# Patient Record
Sex: MALE | Race: WHITE
[De-identification: names, ages, dates, MRNs, and addresses within clinical notes are randomized per-mention and may not be internally consistent; named-entity substitution may affect disease eponyms.]

---

## 2022-03-30 ENCOUNTER — HOSPITAL ENCOUNTER (OUTPATIENT)
Dept: HOSPITAL 75 - CARD | Age: 67
End: 2022-03-30
Attending: FAMILY MEDICINE
Payer: MEDICARE

## 2022-03-30 DIAGNOSIS — R41.0: ICD-10-CM

## 2022-03-30 DIAGNOSIS — R79.82: ICD-10-CM

## 2022-03-30 DIAGNOSIS — R07.9: Primary | ICD-10-CM

## 2022-03-30 LAB
BASOPHILS # BLD AUTO: 0 10^3/UL (ref 0–0.1)
BASOPHILS NFR BLD AUTO: 1 % (ref 0–10)
CK MB SERPL-MCNC: 2.3 NG/ML (ref ?–6.6)
CK SERPL-CCNC: 152 U/L (ref 30–200)
EOSINOPHIL # BLD AUTO: 0.1 10^3/UL (ref 0–0.3)
EOSINOPHIL NFR BLD AUTO: 1 % (ref 0–10)
ERYTHROCYTE [SEDIMENTATION RATE] IN BLOOD: 40 MM/HR (ref 0–30)
HCT VFR BLD CALC: 35 % (ref 40–54)
HGB BLD-MCNC: 12.2 G/DL (ref 13.3–17.7)
LYMPHOCYTES # BLD AUTO: 1.3 10^3/UL (ref 1–4)
LYMPHOCYTES NFR BLD AUTO: 26 % (ref 12–44)
MANUAL DIFFERENTIAL PERFORMED BLD QL: NO
MCH RBC QN AUTO: 32 PG (ref 25–34)
MCHC RBC AUTO-ENTMCNC: 35 G/DL (ref 32–36)
MCV RBC AUTO: 90 FL (ref 80–99)
MONOCYTES # BLD AUTO: 0.5 10^3/UL (ref 0–1)
MONOCYTES NFR BLD AUTO: 11 % (ref 0–12)
NEUTROPHILS # BLD AUTO: 2.9 10^3/UL (ref 1.8–7.8)
NEUTROPHILS NFR BLD AUTO: 60 % (ref 42–75)
PLATELET # BLD: 185 10^3/UL (ref 130–400)
PMV BLD AUTO: 8.7 FL (ref 9–12.2)
WBC # BLD AUTO: 4.8 10^3/UL (ref 4.3–11)

## 2022-03-30 PROCEDURE — 85652 RBC SED RATE AUTOMATED: CPT

## 2022-03-30 PROCEDURE — 82553 CREATINE MB FRACTION: CPT

## 2022-03-30 PROCEDURE — 86141 C-REACTIVE PROTEIN HS: CPT

## 2022-03-30 PROCEDURE — 84484 ASSAY OF TROPONIN QUANT: CPT

## 2022-03-30 PROCEDURE — 84153 ASSAY OF PSA TOTAL: CPT

## 2022-03-30 PROCEDURE — 82550 ASSAY OF CK (CPK): CPT

## 2022-03-30 PROCEDURE — 93005 ELECTROCARDIOGRAM TRACING: CPT

## 2022-03-30 PROCEDURE — 85025 COMPLETE CBC W/AUTO DIFF WBC: CPT

## 2022-03-30 PROCEDURE — 36415 COLL VENOUS BLD VENIPUNCTURE: CPT

## 2022-04-14 ENCOUNTER — HOSPITAL ENCOUNTER (OUTPATIENT)
Dept: HOSPITAL 75 - RAD | Age: 67
End: 2022-04-14
Attending: NURSE PRACTITIONER
Payer: MEDICARE

## 2022-04-14 DIAGNOSIS — Z12.2: Primary | ICD-10-CM

## 2022-04-14 DIAGNOSIS — Z87.891: ICD-10-CM

## 2022-04-14 PROCEDURE — 71271 CT THORAX LUNG CANCER SCR C-: CPT

## 2022-04-14 NOTE — DIAGNOSTIC IMAGING REPORT
CT Lung Screening



INDICATION: History of smoking, pack year data not provided.

Patient presents for low-dose baseline CT screening.



TECHNIQUE: Noncontrast, low-dose CT imaging performed according

to the lung cancer screening protocol. Auto Exposure Controls

were utilize during the CT exam to meet ALARA standards for

radiation dose reduction.



COMPARISON: Baseline.



FINDINGS: No suspicious lung mass. No dominant pulmonary nodule.

No findings of lung cancer. Within the right upper lobe and to a

lesser extent the superior segment of the right lower lobe are

some mild scattered tree-in-bud nodular infiltrates which may

reflect nonspecific bronchiolitis. Patient has slight thickening

of the central airways consistent with a component of underlying

bronchitis. No endobronchial filling defect. There is no thoracic

lymphadenopathy. There is no pleural or pericardial effusion. The

aorta is nonaneurysmal. There are coronary artery atherosclerotic

vascular calcification, severely involving the left main and

substantially found in the LAD. Upper abdomen is nonacute.



IMPRESSION: No findings of lung cancer.



LUNG-RADS CATEGORY: 1

MODIFIER: None.

OTHER SIGNIFICANT FINDINGS:

1. Faint tree-in-bud nodular infiltrates in the right upper and

superior segment of the right lower lobes with mild

bronchiectasis and bronchial wall thickening suggest nonspecific

mild bronchiolitis and bronchitis.

2. Coronary artery atherosclerosis.



Dictated by: 



  Dictated on workstation # [id number]

## 2022-04-21 ENCOUNTER — HOSPITAL ENCOUNTER (OUTPATIENT)
Dept: HOSPITAL 75 - RAD | Age: 67
End: 2022-04-21
Attending: UROLOGY
Payer: MEDICARE

## 2022-04-21 DIAGNOSIS — N40.0: Primary | ICD-10-CM

## 2022-04-21 DIAGNOSIS — R31.29: ICD-10-CM

## 2022-04-21 PROCEDURE — 74176 CT ABD & PELVIS W/O CONTRAST: CPT

## 2022-04-21 NOTE — DIAGNOSTIC IMAGING REPORT
PROCEDURE: CT abdomen and pelvis without contrast.



TECHNIQUE: Multiple contiguous axial images were obtained through

the abdomen and pelvis without the use of intravenous contrast.

Auto Exposure Controls were utilized during the CT exam to meet

ALARA standards for radiation dose reduction.



INDICATION: Microhematuria.



FINDINGS: The lung bases are clear. The liver and gallbladder are

unremarkable. Pancreas and spleen are unremarkable. No adrenal

mass is detected. Kidneys are without calculi or hydronephrosis.

Ureters are nondilated. No ureteral or bladder calculi are seen.

Aorta is nonaneurysmal. No central retroperitoneal or mesenteric

lymphadenopathy is seen. Bowel loops appear to be nonobstructed.

There is moderate stool in the colon. No free fluid or fluid

collection is seen. No pelvic lymphadenopathy is identified. Bony

structures are nonacute.



IMPRESSION: Unremarkable noncontrast CT of the abdomen and

pelvis. No definite urinary tract calculi, mass or obstruction is

detected.



Dictated by: 



  Dictated on workstation # [id number]

## 2022-05-04 ENCOUNTER — HOSPITAL ENCOUNTER (OUTPATIENT)
Dept: HOSPITAL 75 - CARD | Age: 67
End: 2022-05-04
Attending: INTERNAL MEDICINE
Payer: MEDICARE

## 2022-05-04 DIAGNOSIS — R00.2: Primary | ICD-10-CM

## 2022-05-04 DIAGNOSIS — R06.09: ICD-10-CM

## 2022-05-04 PROCEDURE — 93225 XTRNL ECG REC<48 HRS REC: CPT

## 2022-05-04 PROCEDURE — 93226 XTRNL ECG REC<48 HR SCAN A/R: CPT

## 2022-05-04 PROCEDURE — 93306 TTE W/DOPPLER COMPLETE: CPT

## 2022-05-06 ENCOUNTER — HOSPITAL ENCOUNTER (OUTPATIENT)
Dept: HOSPITAL 75 - CARD | Age: 67
End: 2022-05-06
Attending: INTERNAL MEDICINE
Payer: MEDICARE

## 2022-05-06 VITALS — SYSTOLIC BLOOD PRESSURE: 116 MMHG | DIASTOLIC BLOOD PRESSURE: 76 MMHG

## 2022-05-06 DIAGNOSIS — R06.09: Primary | ICD-10-CM

## 2022-05-06 PROCEDURE — 93017 CV STRESS TEST TRACING ONLY: CPT

## 2022-05-06 PROCEDURE — 78452 HT MUSCLE IMAGE SPECT MULT: CPT

## 2022-05-10 NOTE — STRESS TEST
DATE OF SERVICE:  05/06/2022



RESTING AND POST REGADENOSON TECHNETIUM-99M TETROFOSMIN SPECT CT IMAGING



ORDERING PHYSICIAN:

Dr. Miranda.



PRIMARY PHYSICIAN:

Dr. Paz.



CLINICAL DIAGNOSIS:

Shortness of breath.



Baseline images were carried out after injection of 10.13 mCi of technetium-99m

Tetrofosmin.  This was followed by 0.4 mg regadenoson and 31.4 mCi of

technetium-99m Tetrofosmin for stress imaging.  The electrocardiogram showed

sinus rhythm at baseline.  It did not change significantly with regadenoson

infusion.  The patient noted a feeling of indigestion and upset stomach

following regadenoson infusion, which resolved in a few minutes.  Review of

images at rest and following stress indicates a moderate basal inferior

perfusion defect, which appears transient.  Gated images show normal global left

ventricular systolic function with normal regional wall motion.  Left

ventricular ejection fraction is calculated to be 74%.



CONCLUSIONS:

1.  This study is indicative of a moderate amount of basal inferior ischemia.

2.  Normal regional wall motion.

3.  Normal global left ventricular systolic function with a calculated ejection

fraction of 74%.





Job ID: [id number]

DocumentID: [id number]

Dictated Date:  05/10/2022 18:02:48

Transcription Date: 05/10/2022 21:35:45

Dictated By: CINDA MIRANDA MD, MA, FACP, FACC,

## 2022-05-31 ENCOUNTER — HOSPITAL ENCOUNTER (OUTPATIENT)
Dept: HOSPITAL 75 - CATH | Age: 67
LOS: 1 days | Discharge: HOME | End: 2022-06-01
Attending: INTERNAL MEDICINE
Payer: MEDICARE

## 2022-05-31 VITALS — DIASTOLIC BLOOD PRESSURE: 67 MMHG | SYSTOLIC BLOOD PRESSURE: 129 MMHG

## 2022-05-31 VITALS — DIASTOLIC BLOOD PRESSURE: 81 MMHG | SYSTOLIC BLOOD PRESSURE: 125 MMHG

## 2022-05-31 VITALS — DIASTOLIC BLOOD PRESSURE: 79 MMHG | SYSTOLIC BLOOD PRESSURE: 106 MMHG

## 2022-05-31 VITALS — DIASTOLIC BLOOD PRESSURE: 93 MMHG | SYSTOLIC BLOOD PRESSURE: 160 MMHG

## 2022-05-31 VITALS — SYSTOLIC BLOOD PRESSURE: 132 MMHG | DIASTOLIC BLOOD PRESSURE: 83 MMHG

## 2022-05-31 VITALS — DIASTOLIC BLOOD PRESSURE: 80 MMHG | SYSTOLIC BLOOD PRESSURE: 135 MMHG

## 2022-05-31 VITALS — SYSTOLIC BLOOD PRESSURE: 121 MMHG | DIASTOLIC BLOOD PRESSURE: 76 MMHG

## 2022-05-31 VITALS — DIASTOLIC BLOOD PRESSURE: 80 MMHG | SYSTOLIC BLOOD PRESSURE: 134 MMHG

## 2022-05-31 VITALS — BODY MASS INDEX: 30.93 KG/M2 | HEIGHT: 65.98 IN | WEIGHT: 192.46 LBS

## 2022-05-31 VITALS — SYSTOLIC BLOOD PRESSURE: 133 MMHG | DIASTOLIC BLOOD PRESSURE: 72 MMHG

## 2022-05-31 VITALS — SYSTOLIC BLOOD PRESSURE: 112 MMHG | DIASTOLIC BLOOD PRESSURE: 71 MMHG

## 2022-05-31 VITALS — DIASTOLIC BLOOD PRESSURE: 72 MMHG | SYSTOLIC BLOOD PRESSURE: 126 MMHG

## 2022-05-31 VITALS — DIASTOLIC BLOOD PRESSURE: 79 MMHG | SYSTOLIC BLOOD PRESSURE: 120 MMHG

## 2022-05-31 VITALS — DIASTOLIC BLOOD PRESSURE: 86 MMHG | SYSTOLIC BLOOD PRESSURE: 152 MMHG

## 2022-05-31 VITALS — SYSTOLIC BLOOD PRESSURE: 118 MMHG | DIASTOLIC BLOOD PRESSURE: 70 MMHG

## 2022-05-31 VITALS — DIASTOLIC BLOOD PRESSURE: 79 MMHG | SYSTOLIC BLOOD PRESSURE: 133 MMHG

## 2022-05-31 DIAGNOSIS — I25.10: Primary | ICD-10-CM

## 2022-05-31 DIAGNOSIS — F12.90: ICD-10-CM

## 2022-05-31 DIAGNOSIS — R00.2: ICD-10-CM

## 2022-05-31 DIAGNOSIS — E78.5: ICD-10-CM

## 2022-05-31 DIAGNOSIS — R91.8: ICD-10-CM

## 2022-05-31 DIAGNOSIS — Z79.899: ICD-10-CM

## 2022-05-31 DIAGNOSIS — Z86.73: ICD-10-CM

## 2022-05-31 LAB
ALBUMIN SERPL-MCNC: 4.5 GM/DL (ref 3.2–4.5)
ALP SERPL-CCNC: 56 U/L (ref 40–136)
ALT SERPL-CCNC: 18 U/L (ref 0–55)
APTT BLD: 31 SEC (ref 24–35)
BASOPHILS # BLD AUTO: 0 10^3/UL (ref 0–0.1)
BASOPHILS NFR BLD AUTO: 1 % (ref 0–10)
BILIRUB SERPL-MCNC: 0.7 MG/DL (ref 0.1–1)
BUN/CREAT SERPL: 11
BUN/CREAT SERPL: 12
CALCIUM SERPL-MCNC: 8.9 MG/DL (ref 8.5–10.1)
CALCIUM SERPL-MCNC: 9.6 MG/DL (ref 8.5–10.1)
CHLORIDE SERPL-SCNC: 103 MMOL/L (ref 98–107)
CHLORIDE SERPL-SCNC: 99 MMOL/L (ref 98–107)
CHOLEST SERPL-MCNC: 171 MG/DL (ref ?–200)
CO2 SERPL-SCNC: 21 MMOL/L (ref 21–32)
CO2 SERPL-SCNC: 25 MMOL/L (ref 21–32)
CREAT SERPL-MCNC: 0.92 MG/DL (ref 0.6–1.3)
CREAT SERPL-MCNC: 1.11 MG/DL (ref 0.6–1.3)
EOSINOPHIL # BLD AUTO: 0.1 10^3/UL (ref 0–0.3)
EOSINOPHIL NFR BLD AUTO: 2 % (ref 0–10)
GFR SERPLBLD BASED ON 1.73 SQ M-ARVRAT: 73 ML/MIN
GFR SERPLBLD BASED ON 1.73 SQ M-ARVRAT: 92 ML/MIN
GLUCOSE SERPL-MCNC: 102 MG/DL (ref 70–105)
GLUCOSE SERPL-MCNC: 95 MG/DL (ref 70–105)
HCT VFR BLD CALC: 38 % (ref 40–54)
HCT VFR BLD CALC: 41 % (ref 40–54)
HDLC SERPL-MCNC: 44 MG/DL (ref 40–60)
HGB BLD-MCNC: 13 G/DL (ref 13.3–17.7)
HGB BLD-MCNC: 13.7 G/DL (ref 13.3–17.7)
INR PPP: 1 (ref 0.8–1.4)
LYMPHOCYTES # BLD AUTO: 1.2 10^3/UL (ref 1–4)
LYMPHOCYTES NFR BLD AUTO: 31 % (ref 12–44)
MAGNESIUM SERPL-MCNC: 2 MG/DL (ref 1.6–2.4)
MANUAL DIFFERENTIAL PERFORMED BLD QL: NO
MCH RBC QN AUTO: 31 PG (ref 25–34)
MCH RBC QN AUTO: 32 PG (ref 25–34)
MCHC RBC AUTO-ENTMCNC: 34 G/DL (ref 32–36)
MCHC RBC AUTO-ENTMCNC: 34 G/DL (ref 32–36)
MCV RBC AUTO: 93 FL (ref 80–99)
MCV RBC AUTO: 93 FL (ref 80–99)
MONOCYTES # BLD AUTO: 0.3 10^3/UL (ref 0–1)
MONOCYTES NFR BLD AUTO: 8 % (ref 0–12)
NEUTROPHILS # BLD AUTO: 2.3 10^3/UL (ref 1.8–7.8)
NEUTROPHILS NFR BLD AUTO: 59 % (ref 42–75)
PLATELET # BLD: 137 10^3/UL (ref 130–400)
PLATELET # BLD: 140 10^3/UL (ref 130–400)
PMV BLD AUTO: 9.2 FL (ref 9–12.2)
PMV BLD AUTO: 9.5 FL (ref 9–12.2)
POTASSIUM SERPL-SCNC: 4.2 MMOL/L (ref 3.6–5)
POTASSIUM SERPL-SCNC: 4.4 MMOL/L (ref 3.6–5)
PROT SERPL-MCNC: 7.4 GM/DL (ref 6.4–8.2)
PROTHROMBIN TIME: 13.5 SEC (ref 12.2–14.7)
SODIUM SERPL-SCNC: 135 MMOL/L (ref 135–145)
SODIUM SERPL-SCNC: 136 MMOL/L (ref 135–145)
TRIGL SERPL-MCNC: 131 MG/DL (ref ?–150)
VLDLC SERPL CALC-MCNC: 26 MG/DL (ref 5–40)
WBC # BLD AUTO: 4 10^3/UL (ref 4.3–11)
WBC # BLD AUTO: 4.2 10^3/UL (ref 4.3–11)

## 2022-05-31 PROCEDURE — 85025 COMPLETE CBC W/AUTO DIFF WBC: CPT

## 2022-05-31 PROCEDURE — 80053 COMPREHEN METABOLIC PANEL: CPT

## 2022-05-31 PROCEDURE — 80061 LIPID PANEL: CPT

## 2022-05-31 PROCEDURE — 87081 CULTURE SCREEN ONLY: CPT

## 2022-05-31 PROCEDURE — 85730 THROMBOPLASTIN TIME PARTIAL: CPT

## 2022-05-31 PROCEDURE — 36415 COLL VENOUS BLD VENIPUNCTURE: CPT

## 2022-05-31 PROCEDURE — 83735 ASSAY OF MAGNESIUM: CPT

## 2022-05-31 PROCEDURE — 85027 COMPLETE CBC AUTOMATED: CPT

## 2022-05-31 PROCEDURE — 80048 BASIC METABOLIC PNL TOTAL CA: CPT

## 2022-05-31 PROCEDURE — 93005 ELECTROCARDIOGRAM TRACING: CPT

## 2022-05-31 PROCEDURE — 85610 PROTHROMBIN TIME: CPT

## 2022-05-31 PROCEDURE — 93458 L HRT ARTERY/VENTRICLE ANGIO: CPT

## 2022-05-31 RX ADMIN — SODIUM CHLORIDE SCH MLS/HR: 900 INJECTION, SOLUTION INTRAVENOUS at 10:50

## 2022-05-31 RX ADMIN — SODIUM CHLORIDE SCH MLS/HR: 900 INJECTION, SOLUTION INTRAVENOUS at 11:29

## 2022-05-31 RX ADMIN — SODIUM CHLORIDE SCH MLS/HR: 900 INJECTION, SOLUTION INTRAVENOUS at 07:26

## 2022-05-31 RX ADMIN — CARVEDILOL SCH MG: 25 TABLET, FILM COATED ORAL at 21:08

## 2022-05-31 NOTE — CARDIAC CATHETERIZATION
DATE OF SERVICE:  05/31/2022



CARDIAC CATHETERIZATION AND CORONARY INTERVENTION REPORT



The patient is a 66-year-old gentleman who has multiple coronary artery disease

risk factors and who has been experiencing chest discomfort.  Myocardial

perfusion imaging was indicative of a considerable ischemia.  Cardiac

catheterization was carried out.  Informed consent was obtained for cardiac

catheterization, possible ad hoc coronary intervention.



PROCEDURE IN DETAIL:

He was brought to the cardiac catheterization laboratory in a fasting state. 

Right groin was prepared and draped in the usual sterile fashion.  Lidocaine 1%

was used for local anesthesia.  Modified Seldinger technique was used to advance

a 5-Dominican sheath into the right femoral artery, 5-Dominican JL4 catheter was used

for left coronary angiography and 5-Dominican JR4 catheter used for right coronary

angiography and for left heart catheterization and left ventricular angiography.

 Subsequently, fractional flow reserve measurement was carried out in the ostial

and proximal left anterior descending and this was followed by percutaneous

intervention and these are described below.



FRACTIONAL FLOW RESERVE MEASUREMENT IN THE LEFT ANTERIOR DESCENDING:

The ostial and proximal left anterior descending appeared to have considerable

stenosis, but the severity was difficult to .  There also appeared to be

considerable calcification in this area.  We carried out fractional flow reserve

measurement.  We exchanged the sheath over a wire for a 6-Dominican sheath.  We

gave 5000 units of intravenous heparin.  We used 6-Dominican JL4 guide catheter. 

We advanced a pressure wire across the lesion and fractional flow reserve

measurement was carried out, which indicated that the fractional flow reserve

was 0.75.  Thus, this was a hemodynamically significant lesion and we proceeded

with percutaneous intervention.



PERCUTANEOUS INTERVENTION TO THE LEFT ANTERIOR DESCENDING:

We gave a double bolus of Integrilin during the interventional procedure.  We

used the pressure wire to advance 2.5 x 15 mm balloon to the lesion in the

ostial and proximal left anterior descending and balloon was inflated to 18

atmospheres.  Subsequent angiography revealed approximately 50 to 60% stenosis

at the previous site of nearly 90% stenosis.  We then removed the balloon and

tried to advance a Skypoint 2.75 x 12 mm stent, but we were not able to advance

it across the lesion.  We then removed the pressure wire and replaced it with a

ChoICE extra support wire, and we were then able to advance the stent with

moderate difficulty.  We were able to get it through the lesion and then pulled

back to carefully position it to cover the entire lesion and the stent balloon

was inflated to 16 atmospheres.  Full stent expansion was achieved.  We then

collapsed the balloon and pulled it back slightly proximally to cover the

proximal two thirds of the standard segment and the balloon extended back

through the ostial LAD into the distal left main and the balloon was inflated to

20 atmospheres.  The balloon was then collapsed and removed.  Subsequent

angiography revealed 0% residual stenosis at the previous site of 90% stenosis

in the proximal left anterior descending and flow throughout the vessel was

normal.  No other vessels were compromised with this stenting.  The patient

tolerated the procedure well.  Angioplasty equipment was removed.  Angiography

of the right femoral artery was carried out through the sheath.  Mynx was used

to achieve hemostasis.



HEMODYNAMICS:

Left ventricular end-diastolic pressure following coronary angiography was 16

mmHg.  There was no significant pressure gradient on pullback across the aortic

valve.



LEFT VENTRICULAR ANGIOGRAPHY:

Left ventricular angiography was carried out in right anterior oblique

projection.  Global left ventricular systolic function is well preserved.  Left

ventricular ejection fraction approximately 60% and no significant wall motion

abnormality was seen in this view.



CORONARY ANGIOGRAPHY:

Coronary calcification is seen that involved the proximal portions of the left

main and the proximal portion of the left coronary system.  The left anterior

descending artery had up to 90% proximal stenosis that was successfully stented

with Skypoint 2.75 x 15 mm stent.  The left circumflex artery has mild to

moderate plaque.  A ramus intermedius artery does not exhibit significant

disease.  Right coronary artery is small and nondominant and does not exhibit

significant stenosis.



CONCLUSIONS:

1.  Coronary artery disease primarily consisting of 90% proximal stenosis of the

left anterior descending to which successful stenting was carried out with

Skypoint 2.75 x 12 mm stent that was deployed at 18 atmospheres.  The rest of

the coronary system has mild to moderate plaque and proximal portion of the

coronaries have moderate calcification.

2.  Well preserved global left ventricular systolic function with ejection

fraction of 60% to 65%.

3.  Left ventricular end-diastolic pressure is 16 mmHg.



DISCUSSION AND RECOMMENDATIONS:

We have added a dual antiplatelet therapy regimen.  He is already on statin

therapy that is being continued.  Risk factor modification has been reviewed.





Job ID: [id number]

DocumentID: [id number]

Dictated Date:  05/31/2022 10:02:27

Transcription Date: 05/31/2022 10:41:04

Dictated By: CINDA CHIRINOS MD, MA, FACP, FACC,

## 2022-05-31 NOTE — CARDIAC PROCEDURE NOTE-CS/ASA
Pre-Procedure Note


Pre-Op Procedure Note


H&P Reviewed


The H&P was reviewed, patient examined and no changes noted.


Date H&P Reviewed:  May 31, 2022


Time H&P Reviewed:  08:45





Conscious Sedation Pre-Proced


Time


08:45





ASA Score


3


For ASA 3 and 4: Consider anesthesia and medical clearance. Also, for patients

with a history of failed moderate sedation consider anesthesia.

















Airway 


 


Lungs 


 


Heart 


 


 ASA score


 


 ASA 1: a normal healthy patient


 


 ASA 2:  a patient with a mild systemic disease (mid diabetes, controlled 

hypertension, obesity 


 


 ASA 3:  a patient with a severe systemic disease that limits activity  (angina,

COPD, prior Myocardial infarction)


 


 ASA 4:  a patient with an incapacitating disease that is a constant threat to 

life (CHF, renal failure)


 


 ASA 5:  a moribund patient not expected to survive 24 hrs.  (ruptured aneurysm)


 


 ASA 6:  a declared brain-dead patient whose organs are being harvested.


 


 For emergent operations, add the letter E after the classification











Mallampati Classification


Grade 2





Sedation Plan


Analgesia, Amnesia, Plan communicated to team members, Discussed options with 

patient/fam, Discussed risks with patient/fam


The patient is an appropriate candidate to undergo the planned procedure, 

sedation, and anesthesia.





The patient immediately re-assessed prior to indication.











CINDA CHIRINOS MD FACP FAC CCDS   May 31, 2022 11:42

## 2022-06-01 VITALS — DIASTOLIC BLOOD PRESSURE: 66 MMHG | SYSTOLIC BLOOD PRESSURE: 114 MMHG

## 2022-06-01 VITALS — DIASTOLIC BLOOD PRESSURE: 62 MMHG | SYSTOLIC BLOOD PRESSURE: 117 MMHG

## 2022-06-01 RX ADMIN — SODIUM CHLORIDE SCH MLS/HR: 900 INJECTION, SOLUTION INTRAVENOUS at 00:46

## 2022-06-01 RX ADMIN — CARVEDILOL SCH MG: 25 TABLET, FILM COATED ORAL at 09:08

## 2022-06-01 NOTE — PROGRESS NOTE - CARDIOLOGY
Cardiology SOAP Progress Note


Subjective:


Lying in bed


Wants to go home


No c/o CP, SOB or palpitations


C/O mild right groin tenderness at the cardiac cath site with palpation





Objective:


I&O/Vital Signs











 5/31/22 5/31/22 6/1/22 6/1/22





 21:00 23:04 01:00 03:55


 


Temp  35.8  36.8


 


Pulse  65 60 65


 


Resp  15  18


 


B/P (MAP)  133/72 (92)  114/66 (82)


 


Pulse Ox  96  97


 


O2 Delivery Room Air Room Air  Room Air














 6/1/22





 00:00


 


Intake Total 620 ml


 


Output Total 600 ml


 


Balance 20 ml








Side:  right


Groin site without hematoma:  Yes


Condition:  DP/PT pulses palpable, extremity w/d/p


Bruising:  mild bruising


Constitutional:  AAO x 3, well-developed, well-nourished


Respiratory:  No accessory muscle use, No respiratory distress; chest expansion 

is symmetric, chest is bilaterally symmetric, rhonchi (scattered)


Cardiovascular:  regular rate-rhythm; No JVD; S1 and S2


Gastrointestional:  No tender; soft, round, audible bowel sounds


Extremities:  no lower extremity edema bilateral


Neurologic/Psychiatric:  grossly intact (moves all extremities)


Skin:  No rash on exposed areas, No ulcerations on exposed areas





Results/Procedures:


Labs


Laboratory Tests


5/31/22 11:55: 


White Blood Count 4.0L, Red Blood Count 4.13L, Hemoglobin 13.0L, Hematocrit 38L,

Mean Corpuscular Volume 93, Mean Corpuscular Hemoglobin 32, Mean Corpuscular 

Hemoglobin Concent 34, Red Cell Distribution Width 13.5, Platelet Count 137, 

Mean Platelet Volume 9.5, Immature Granulocyte % (Auto) 1, Neutrophils (%) 

(Auto) 59, Lymphocytes (%) (Auto) 31, Monocytes (%) (Auto) 8, Eosinophils (%) 

(Auto) 2, Basophils (%) (Auto) 1, Neutrophils # (Auto) 2.3, Lymphocytes # (Auto)

1.2, Monocytes # (Auto) 0.3, Eosinophils # (Auto) 0.1, Basophils # (Auto) 0.0, 

Immature Granulocyte # (Auto) 0.0, Percent Immature Platelet Fraction 1.7, 

Sodium Level 135, Potassium Level 4.4, Chloride Level 103, Carbon Dioxide Level 

21, Anion Gap 11, Blood Urea Nitrogen 11, Creatinine 0.92, Estimat Glomerular 

Filtration Rate 92, BUN/Creatinine Ratio 12, Glucose Level 95, Calcium Level 

8.9, Magnesium Level 2.0





Microbiology


5/31/22 MRSA Screen - Final, Complete


          MRSA not isolated





Laboratory Tests


5/31/22 07:26








5/31/22 11:55











A/P:


Assessment:


CAD


- Cardiac cath of 5-31-22:   Coronary artery disease primarily consisting of 90%

proximal stenosis of the left anterior descending to which successful stenting 

was carried out with Skypoint 2.75 x 12 mm stent that was deployed at 18 

atmospheres.  The rest of the coronary system has mild to moderate plaque and 

proximal portion of the coronaries have moderate calcification. Well preserved 

global left ventricular systolic function with ejection fraction of 60% to 65%. 

Left ventricular end-diastolic pressure is 16 mmHg.





Palpitations


- undetermined etiology





H/O marijuana use


- cessation advised





H/o TIAs





CT chest 4/14/22


- pulm nodules, followed by Dr Paz


- coronary calcium





Hyperlipidemian


- treated with stating and managed by Dr Paz


Plan:


Ok to discharge home today


Continue current medication regimen including statin, Plavix, ASA


Advise out pt f/u in 2 weeks or sooner if needed











JOSE R NOWAK            Jun 1, 2022 07:58

## 2022-06-01 NOTE — DISCHARGE INST-CARDIOLOGY
Discharge Inst-Cardiac


Discharge Medications


New Medications:  


Aspirin (Children's Aspirin) 81 Mg Tab.chew


0 MG PO DAILY, #90 TAB 3 Refills





Clopidogrel Bisulfate (Clopidogrel) 75 Mg Tablet


75 MG PO DAILY, #90 TAB 3 Refills





 


Continued Medications:  


Diazepam (Diazepam) 10 Mg Tablet


10 MG PO Q6H PRN for ANXIETY, TAB





Fluoxetine HCl (Fluoxetine HCl) 40 Mg Capsule


40 MG PO DAILY, CAP





Furosemide (Furosemide) 20 Mg Tablet


20 MG PO DAILY, TAB





Gabapentin (Neurontin) 300 Mg Capsule


300 MG PO DAILY, CAP





Levothyroxine Sodium (Levothyroxine) 50 Mcg Capsule


50 MCG PO DAILY, CAP





Losartan Potassium (Losartan Potassium) 100 Mg Tablet


100 MG PO DAILY, TAB





Omeprazole (Omeprazole) 20 Mg Capsule.dr


40 MG PO DAILY, CAP





Potassium Chloride (Potassium Chloride) 20 Meq Tablet.er


20 MEQ PO DAILY, TAB





Ropinirole HCl (Ropinirole HCl) 2 Mg Tablet


2 MG PO TID, TAB





Simvastatin (Simvastatin) 20 Mg Tablet


20 MG PO DAILY, TAB





 


Discontinued Medications:  


Aspirin (Aspirin EC) 81 Mg Tablet.dr


81 MG PO DAILY, TAB








New, Converted or Re-Newed RX:  Transmitted to Pharmacy





Patient Instructions


Patient Instructions:  


Please schedule follow up appointment to see Dr. Miranda in 2 weeks











JOSE R NOWAK            Jun 1, 2022 08:00

## 2022-06-01 NOTE — PROGRESS NOTE - CARDIOLOGY
Cardiology SOAP Progress Note


Subjective:


No cp or palp or syncope or shortness of breath


No n/v/d


No focal weakness


No groin or leg discomfort or discoloration





Objective:


I&O/Vital Signs











 6/1/22 6/1/22 6/1/22 6/1/22





 03:55 07:00 08:00 09:00


 


Temp 36.8  36.6 


 


Pulse 65 75 62 


 


Resp 18  20 


 


B/P (MAP) 114/66 (82)  117/62 (80) 


 


Pulse Ox 97  93 


 


O2 Delivery Room Air  Room Air Room Air














 6/1/22





 00:00


 


Intake Total 620 ml


 


Output Total 600 ml


 


Balance 20 ml








Side:  right


Groin site without hematoma:  Yes


Condition:  DP/PT pulses palpable, extremity w/d/p


Bruising:  mild bruising


Constitutional:  AAO x 3, well-developed, well-nourished


Respiratory:  No accessory muscle use, No respiratory distress; chest expansion 

is symmetric, chest is bilaterally symmetric, rhonchi (scattered)


Cardiovascular:  regular rate-rhythm; No JVD; S1 and S2


Gastrointestional:  No tender; soft, round, audible bowel sounds


Extremities:  no lower extremity edema bilateral


Neurologic/Psychiatric:  grossly intact (moves all extremities)


Skin:  No rash on exposed areas, No ulcerations on exposed areas





Results/Procedures:


Labs





Microbiology


5/31/22 MRSA Screen - Final, Complete


          MRSA not isolated





Laboratory Tests


5/31/22 07:26








5/31/22 11:55











A/P:


Assessment:


CAD


- Cardiac cath of 5-31-22:   Coronary artery disease primarily consisting of 90%

proximal stenosis of the left anterior descending to which successful stenting 

was carried out with Skypoint 2.75 x 12 mm stent that was deployed at 18 

atmospheres.  The rest of the coronary system has mild to moderate plaque and 

proximal portion of the coronaries have moderate calcification. Well preserved 

global left ventricular systolic function with ejection fraction of 60% to 65%. 

Left ventricular end-diastolic pressure is 16 mmHg.





Palpitations


- undetermined etiology





H/O marijuana use


- cessation advised





H/o TIAs





CT chest 4/14/22


- pulm nodules, followed by Dr Paz


- coronary calcium





Hyperlipidemian


- treated with stating and managed by Dr Paz


Plan:


I discussed his CV findings and interventions with him 


I discussed his meds with him and advised compliance


Ok to discharge home today


Continue current medication regimen including statin, Plavix, ASA


Advise out pt f/u in 2 weeks or sooner if needed











CINDA CHIRINOS MD FACP FAC CCDS    Jun 1, 2022 14:46